# Patient Record
Sex: MALE | Race: WHITE | NOT HISPANIC OR LATINO | Employment: FULL TIME | URBAN - METROPOLITAN AREA
[De-identification: names, ages, dates, MRNs, and addresses within clinical notes are randomized per-mention and may not be internally consistent; named-entity substitution may affect disease eponyms.]

---

## 2021-08-02 DIAGNOSIS — Z31.9 ENCOUNTER FOR INFERTILITY: Primary | ICD-10-CM

## 2021-09-13 ENCOUNTER — LAB (OUTPATIENT)
Dept: LAB | Facility: CLINIC | Age: 31
End: 2021-09-13
Attending: OBSTETRICS & GYNECOLOGY
Payer: COMMERCIAL

## 2021-09-13 DIAGNOSIS — Z31.9 ENCOUNTER FOR INFERTILITY: ICD-10-CM

## 2021-09-13 PROCEDURE — 89322 SEMEN ANAL STRICT CRITERIA: CPT

## 2021-09-14 LAB
ABNORMAL SPERM MORPHOLOGY: 100
ABSTINENCE DAYS: 4 DAYS (ref 2–7)
AGGLUTINATION: NO
ANALYSIS TEMP - CENTIGRADE: 21 CENTIGRADE
COLLECTION METHOD: ABNORMAL
COLLECTION SITE: ABNORMAL
CONSENT TO RELEASE TO PARTNER: NO
DAL- RECEIVED TIME: ABNORMAL
HEAD DEFECT: 100 %
IMMOTILE: 26 %
LIQUEFIED: YES
MIDPIECE DEFECT: 48 %
NON-PROGRESSIVE MOTILITY: 4 %
NORMAL SPERM MORPHOLOGY: 0 % NORMAL FORMS
PROGRESSIVE MOTILITY: 70 %
ROUND CELLS: <0.1 MILLION/ML
SPECIMEN PH: 7.6 PH
SPECIMEN VOLUME: 3.6 ML
SPERM CONCENTRATION: 6 MILLION/ML
TAIL DEFECT: 19 %
TIME OF ANALYSIS: ABNORMAL
TOTAL PROGRESSIVE MOTILE NUMBER: 15 MILLION
TOTAL SPERM NUMBER: 22 MILLION
VISCOUS: NO
VITALITY: ABNORMAL

## 2021-10-17 ENCOUNTER — HEALTH MAINTENANCE LETTER (OUTPATIENT)
Age: 31
End: 2021-10-17

## 2022-05-24 ENCOUNTER — OFFICE VISIT (OUTPATIENT)
Dept: FAMILY MEDICINE | Facility: CLINIC | Age: 32
End: 2022-05-24
Payer: COMMERCIAL

## 2022-05-24 VITALS
TEMPERATURE: 98.5 F | RESPIRATION RATE: 18 BRPM | HEART RATE: 68 BPM | WEIGHT: 258 LBS | OXYGEN SATURATION: 98 % | DIASTOLIC BLOOD PRESSURE: 73 MMHG | SYSTOLIC BLOOD PRESSURE: 146 MMHG

## 2022-05-24 DIAGNOSIS — H61.22 IMPACTED CERUMEN OF LEFT EAR: Primary | ICD-10-CM

## 2022-05-24 DIAGNOSIS — H72.92 PERFORATION OF LEFT TYMPANIC MEMBRANE: ICD-10-CM

## 2022-05-24 PROCEDURE — 99203 OFFICE O/P NEW LOW 30 MIN: CPT | Performed by: PHYSICIAN ASSISTANT

## 2022-05-24 ASSESSMENT — ENCOUNTER SYMPTOMS
SINUS PRESSURE: 0
EYE PAIN: 0
COUGH: 0
RHINORRHEA: 0
SINUS PAIN: 0
SORE THROAT: 0

## 2022-05-25 NOTE — PROGRESS NOTES
Patient presents with:  Otalgia: Left ear pain started today taking tylenol and ibuprofen       Clinical Decision Making: Mild cerumen impaction and after cerumen removal in clinic today he reports significant relief of ear pain. Scarring and small perforation noted in L ear, no active AOM noted, possibly a nonhealing perforation given ho AOM and tympanostomy. Pt refused ENT referral at this time.        ICD-10-CM    1. Impacted cerumen of left ear  H61.22    2. Perforation of left tympanic membrane  H72.92        Patient Instructions   -avoid cotton swabs in ear  -keep ears dry  -consider ENT referral for followup        HPI:  Kilo Ferreira is a 32 year old male who presents today complaining of  L ear pain he rates 6/10 that woke him from sleep this am. He has taken ibuprofen which provided some relief and pain is down to 3/10. Pt reports a ho AOM and last was 4yrs ago and cerumen impaction.     History obtained from the patient.    Problem List:  There are no relevant problems documented for this patient.      No past medical history on file.    Social History     Tobacco Use     Smoking status: Former Smoker     Smokeless tobacco: Never Used   Substance Use Topics     Alcohol use: Not on file       Review of Systems   HENT: Positive for ear pain (left). Negative for ear discharge, hearing loss, postnasal drip, rhinorrhea, sinus pressure, sinus pain and sore throat.    Eyes: Negative for pain.   Respiratory: Negative for cough.        Vitals:    05/24/22 1840   BP: (!) 146/73   Pulse: 68   Resp: 18   Temp: 98.5  F (36.9  C)   TempSrc: Oral   SpO2: 98%   Weight: 117 kg (258 lb)       Physical Exam  HENT:      Right Ear: Hearing normal. Tympanic membrane is scarred.      Left Ear: Hearing normal. Tympanic membrane is scarred and perforated (very small perf).      Nose: No congestion or rhinorrhea.      Mouth/Throat:      Dentition: Dental caries (L lower molar) present.      Tonsils: 1+ on the right. 1+ on the  left.     Pulmonary:      Effort: Pulmonary effort is normal.      Breath sounds: Normal breath sounds.   Lymphadenopathy:      Head:      Right side of head: No submandibular or preauricular adenopathy.      Left side of head: No submandibular or preauricular adenopathy.       At the end of the encounter, I discussed results, diagnosis, medications. Discussed red flags for immediate return to clinic/ER, as well as indications for follow up if no improvement. Patient understood and agreed to plan. Patient was stable for discharge.

## 2022-10-03 ENCOUNTER — HEALTH MAINTENANCE LETTER (OUTPATIENT)
Age: 32
End: 2022-10-03

## 2023-02-11 ENCOUNTER — HEALTH MAINTENANCE LETTER (OUTPATIENT)
Age: 33
End: 2023-02-11

## 2023-02-16 ENCOUNTER — LAB (OUTPATIENT)
Dept: LAB | Facility: CLINIC | Age: 33
End: 2023-02-16
Payer: COMMERCIAL

## 2023-02-16 ENCOUNTER — MEDICAL CORRESPONDENCE (OUTPATIENT)
Dept: HEALTH INFORMATION MANAGEMENT | Facility: CLINIC | Age: 33
End: 2023-02-16

## 2023-02-16 DIAGNOSIS — Z84.89 FAMILY HISTORY OF GENETIC DISEASE: ICD-10-CM

## 2023-02-16 DIAGNOSIS — Z84.89 FAMILY HISTORY OF GENETIC DISEASE: Primary | ICD-10-CM

## 2023-02-16 PROCEDURE — 36415 COLL VENOUS BLD VENIPUNCTURE: CPT

## 2023-03-27 LAB
PERFORMING LABORATORY: NORMAL
SPECIMEN STATUS: NORMAL
TEST NAME: NORMAL

## 2024-03-09 ENCOUNTER — HEALTH MAINTENANCE LETTER (OUTPATIENT)
Age: 34
End: 2024-03-09

## 2025-03-16 ENCOUNTER — HEALTH MAINTENANCE LETTER (OUTPATIENT)
Age: 35
End: 2025-03-16